# Patient Record
Sex: FEMALE | Race: WHITE
[De-identification: names, ages, dates, MRNs, and addresses within clinical notes are randomized per-mention and may not be internally consistent; named-entity substitution may affect disease eponyms.]

---

## 2017-01-05 PROBLEM — Z85.828 PERSONAL HISTORY OF OTHER MALIGNANT NEOPLASM OF SKIN: Status: ACTIVE | Noted: 2017-01-05

## 2017-03-31 ENCOUNTER — HOSPITAL (OUTPATIENT)
Dept: OTHER | Age: 57
End: 2017-03-31

## 2017-04-27 ENCOUNTER — HOSPITAL ENCOUNTER (OUTPATIENT)
Age: 57
Discharge: ANOTHER HOSPITAL | End: 2017-04-27
Attending: EMERGENCY MEDICINE
Payer: MEDICAID

## 2017-04-27 ENCOUNTER — APPOINTMENT (OUTPATIENT)
Dept: CT IMAGING | Facility: HOSPITAL | Age: 57
End: 2017-04-27
Attending: EMERGENCY MEDICINE
Payer: MEDICAID

## 2017-04-27 ENCOUNTER — HOSPITAL ENCOUNTER (EMERGENCY)
Facility: HOSPITAL | Age: 57
Discharge: HOME | End: 2017-04-27
Attending: EMERGENCY MEDICINE
Payer: MEDICAID

## 2017-04-27 VITALS
HEART RATE: 77 BPM | WEIGHT: 140 LBS | RESPIRATION RATE: 16 BRPM | OXYGEN SATURATION: 97 % | DIASTOLIC BLOOD PRESSURE: 63 MMHG | HEIGHT: 66 IN | SYSTOLIC BLOOD PRESSURE: 91 MMHG | TEMPERATURE: 98 F | BODY MASS INDEX: 22.5 KG/M2

## 2017-04-27 VITALS
SYSTOLIC BLOOD PRESSURE: 95 MMHG | HEART RATE: 69 BPM | RESPIRATION RATE: 16 BRPM | OXYGEN SATURATION: 98 % | WEIGHT: 140 LBS | BODY MASS INDEX: 23 KG/M2 | DIASTOLIC BLOOD PRESSURE: 59 MMHG | TEMPERATURE: 98 F

## 2017-04-27 DIAGNOSIS — R10.9 ABDOMINAL PAIN, ACUTE: Primary | ICD-10-CM

## 2017-04-27 DIAGNOSIS — K52.9 GASTROENTERITIS: Primary | ICD-10-CM

## 2017-04-27 PROCEDURE — 83690 ASSAY OF LIPASE: CPT | Performed by: EMERGENCY MEDICINE

## 2017-04-27 PROCEDURE — 96361 HYDRATE IV INFUSION ADD-ON: CPT

## 2017-04-27 PROCEDURE — 99205 OFFICE O/P NEW HI 60 MIN: CPT

## 2017-04-27 PROCEDURE — 85025 COMPLETE CBC W/AUTO DIFF WBC: CPT | Performed by: EMERGENCY MEDICINE

## 2017-04-27 PROCEDURE — 81003 URINALYSIS AUTO W/O SCOPE: CPT | Performed by: EMERGENCY MEDICINE

## 2017-04-27 PROCEDURE — 99284 EMERGENCY DEPT VISIT MOD MDM: CPT

## 2017-04-27 PROCEDURE — 80048 BASIC METABOLIC PNL TOTAL CA: CPT | Performed by: EMERGENCY MEDICINE

## 2017-04-27 PROCEDURE — 80076 HEPATIC FUNCTION PANEL: CPT | Performed by: EMERGENCY MEDICINE

## 2017-04-27 PROCEDURE — 74177 CT ABD & PELVIS W/CONTRAST: CPT

## 2017-04-27 PROCEDURE — 96374 THER/PROPH/DIAG INJ IV PUSH: CPT

## 2017-04-27 RX ORDER — SACCHAROMYCES BOULARDII 250 MG
250 CAPSULE ORAL 2 TIMES DAILY
Qty: 20 CAPSULE | Refills: 0 | Status: SHIPPED | OUTPATIENT
Start: 2017-04-27 | End: 2017-05-07

## 2017-04-27 RX ORDER — ONDANSETRON 4 MG/1
4 TABLET, ORALLY DISINTEGRATING ORAL EVERY 4 HOURS PRN
Qty: 15 TABLET | Refills: 0 | Status: SHIPPED | OUTPATIENT
Start: 2017-04-27 | End: 2017-10-13

## 2017-04-27 RX ORDER — ONDANSETRON 2 MG/ML
4 INJECTION INTRAMUSCULAR; INTRAVENOUS ONCE
Status: COMPLETED | OUTPATIENT
Start: 2017-04-27 | End: 2017-04-27

## 2017-04-27 NOTE — ED NOTES
Pt given discharge instructions, prescriptions and follow up.  Pt verbalized understanding, discharged home with son

## 2017-04-27 NOTE — ED PROVIDER NOTES
Patient Seen in: [facility] AND [facility] Immediate [facility] In [address] [place] [address]    History   Patient presents with:  Abdominal Pain    Stated Complaint: v/d stomach pain    HPI    49-year-old female presents for evaluation of abdominal pain.   Patient reports since last ni Effort normal and breath sounds normal. No respiratory distress. Abdominal: Soft. Bowel sounds are normal. She exhibits no distension. TTP at RLQ   Musculoskeletal: Normal range of motion.    Neurological: She is alert and oriented to person, place, and

## 2017-04-27 NOTE — ED PROVIDER NOTES
Patient Seen in: [facility] AND [facility] Emergency Department    History   Patient presents with:  Nausea/Vomiting/Diarrhea (gastrointestinal)    Stated Complaint: Vomiting, sweating, diarrhea. Sent from [facility] urgent care.     HPI      History of present illne Gastrointestinal:  soft, nondistended, tender mid periumbilical and over McBurney's, mild voluntary guarding, no masses normal bowel sounds   Neurological: II-XII grossly intact  no focal deficits  Skin: warm and dry, no rashes.   Musculoskeletal: neck i etiology. 5. 1 cm curvilinear area of calcification near the splenic hilum may be due to aneurysm vs calcific atherosclerosis. 6. No free fluid or air.            Re exam feeling better, reviewed results will d/c home          Disposition and Plan     Clini

## 2017-04-27 NOTE — ED NOTES
Pt presents to the er with c/o nausea, vomiting and diarrhea x 2 episodes since this am. Pt denies abd pain or urinary complaints currently. Pt looks pale and tired. Iv line established in triage, pt medicated per written orders.

## 2017-08-13 ENCOUNTER — HOSPITAL ENCOUNTER (EMERGENCY)
Facility: HOSPITAL | Age: 57
Discharge: HOME | End: 2017-08-13
Attending: EMERGENCY MEDICINE
Payer: MEDICAID

## 2017-08-13 VITALS
BODY MASS INDEX: 22.5 KG/M2 | DIASTOLIC BLOOD PRESSURE: 57 MMHG | HEIGHT: 66 IN | RESPIRATION RATE: 18 BRPM | WEIGHT: 140 LBS | OXYGEN SATURATION: 97 % | HEART RATE: 77 BPM | TEMPERATURE: 97 F | SYSTOLIC BLOOD PRESSURE: 110 MMHG

## 2017-08-13 DIAGNOSIS — K52.9 GASTROENTERITIS: Primary | ICD-10-CM

## 2017-08-13 LAB
ALBUMIN SERPL BCP-MCNC: 4 G/DL (ref 3.5–4.8)
ALP SERPL-CCNC: 44 U/L (ref 32–100)
ALT SERPL-CCNC: 24 U/L (ref 14–54)
ANION GAP SERPL CALC-SCNC: 6 MMOL/L (ref 0–18)
AST SERPL-CCNC: 29 U/L (ref 15–41)
BACTERIA UR QL AUTO: NEGATIVE /HPF
BASOPHILS # BLD: 0 K/UL (ref 0–0.2)
BASOPHILS NFR BLD: 0 %
BILIRUB DIRECT SERPL-MCNC: 0.1 MG/DL (ref 0–0.2)
BILIRUB SERPL-MCNC: 0.7 MG/DL (ref 0.3–1.2)
BILIRUB UR QL: NEGATIVE
BUN SERPL-MCNC: 16 MG/DL (ref 8–20)
BUN/CREAT SERPL: 24.6 (ref 10–20)
CALCIUM SERPL-MCNC: 9.5 MG/DL (ref 8.5–10.5)
CHLORIDE SERPL-SCNC: 106 MMOL/L (ref 95–110)
CLARITY UR: CLEAR
CO2 SERPL-SCNC: 26 MMOL/L (ref 22–32)
COLOR UR: YELLOW
CREAT SERPL-MCNC: 0.65 MG/DL (ref 0.5–1.5)
EOSINOPHIL # BLD: 0.1 K/UL (ref 0–0.7)
EOSINOPHIL NFR BLD: 1 %
ERYTHROCYTE [DISTWIDTH] IN BLOOD BY AUTOMATED COUNT: 13 % (ref 11–15)
GLUCOSE SERPL-MCNC: 111 MG/DL (ref 70–99)
GLUCOSE UR-MCNC: NEGATIVE MG/DL
HCT VFR BLD AUTO: 38.2 % (ref 35–48)
HGB BLD-MCNC: 13.1 G/DL (ref 12–16)
HGB UR QL STRIP.AUTO: NEGATIVE
KETONES UR-MCNC: NEGATIVE MG/DL
LIPASE SERPL-CCNC: 46 U/L (ref 22–51)
LYMPHOCYTES # BLD: 0.7 K/UL (ref 1–4)
LYMPHOCYTES NFR BLD: 10 %
MCH RBC QN AUTO: 30.8 PG (ref 27–32)
MCHC RBC AUTO-ENTMCNC: 34.2 G/DL (ref 32–37)
MCV RBC AUTO: 90 FL (ref 80–100)
MONOCYTES # BLD: 0.4 K/UL (ref 0–1)
MONOCYTES NFR BLD: 6 %
NEUTROPHILS # BLD AUTO: 6.3 K/UL (ref 1.8–7.7)
NEUTROPHILS NFR BLD: 83 %
NITRITE UR QL STRIP.AUTO: NEGATIVE
OSMOLALITY UR CALC.SUM OF ELEC: 288 MOSM/KG (ref 275–295)
PH UR: 7 [PH] (ref 5–8)
PLATELET # BLD AUTO: 140 K/UL (ref 140–400)
PMV BLD AUTO: 9 FL (ref 7.4–10.3)
POTASSIUM SERPL-SCNC: 4 MMOL/L (ref 3.3–5.1)
PROT SERPL-MCNC: 7.6 G/DL (ref 5.9–8.4)
PROT UR-MCNC: NEGATIVE MG/DL
RBC # BLD AUTO: 4.24 M/UL (ref 3.7–5.4)
RBC #/AREA URNS AUTO: 2 /HPF
SODIUM SERPL-SCNC: 138 MMOL/L (ref 136–144)
SP GR UR STRIP: 1.02 (ref 1–1.03)
UROBILINOGEN UR STRIP-ACNC: <2
VIT C UR-MCNC: NEGATIVE MG/DL
WBC # BLD AUTO: 7.6 K/UL (ref 4–11)
WBC #/AREA URNS AUTO: <1 /HPF

## 2017-08-13 PROCEDURE — 81001 URINALYSIS AUTO W/SCOPE: CPT | Performed by: EMERGENCY MEDICINE

## 2017-08-13 PROCEDURE — 80076 HEPATIC FUNCTION PANEL: CPT | Performed by: EMERGENCY MEDICINE

## 2017-08-13 PROCEDURE — 99284 EMERGENCY DEPT VISIT MOD MDM: CPT

## 2017-08-13 PROCEDURE — 80048 BASIC METABOLIC PNL TOTAL CA: CPT | Performed by: EMERGENCY MEDICINE

## 2017-08-13 PROCEDURE — 85025 COMPLETE CBC W/AUTO DIFF WBC: CPT | Performed by: EMERGENCY MEDICINE

## 2017-08-13 PROCEDURE — 83690 ASSAY OF LIPASE: CPT | Performed by: EMERGENCY MEDICINE

## 2017-08-13 PROCEDURE — 96361 HYDRATE IV INFUSION ADD-ON: CPT

## 2017-08-13 PROCEDURE — 96374 THER/PROPH/DIAG INJ IV PUSH: CPT

## 2017-08-13 RX ORDER — ONDANSETRON 4 MG/1
4 TABLET, ORALLY DISINTEGRATING ORAL EVERY 8 HOURS PRN
Qty: 20 TABLET | Refills: 0 | Status: SHIPPED | OUTPATIENT
Start: 2017-08-13 | End: 2017-10-13

## 2017-08-13 RX ORDER — ONDANSETRON 2 MG/ML
4 INJECTION INTRAMUSCULAR; INTRAVENOUS ONCE
Status: COMPLETED | OUTPATIENT
Start: 2017-08-13 | End: 2017-08-13

## 2017-08-13 NOTE — ED NOTES
59yo F arrives to ER with c/o epigastric/mid abdominal pain with nausea and one episode of emesis since last night. Son sick with similar symptoms. Daughter also being seen for similar symptoms in ER. Patient denies diarrhea, urinary complaints or fever.

## 2017-08-13 NOTE — ED PROVIDER NOTES
Patient Seen in: [facility] AND [facility] Emergency Department    History   Patient presents with:  Abdomen/Flank Pain (GI/)    Stated Complaint: Abdominal pain since 10pm last night    HPI    Patient presents with complaint of vomiting.   She had a sense of n 97 °F (36.1 °C)  Temp src: Temporal  SpO2: 97 %  O2 Device: None (Room air)    Current:/64   Pulse 73   Temp (!) 97 °F (36.1 °C) (Temporal)   Resp 14   Ht 167.6 cm (5' 6\")   Wt 63.5 kg   SpO2 97%   BMI 22.60 kg/m²         Physical Exam  Constitution DIFFERENTIAL WITH PLATELET    Narrative: The following orders were created for panel order CBC WITH DIFFERENTIAL WITH PLATELET.   Procedure                               Abnormality         Status                     ---------

## 2017-08-13 NOTE — ED NOTES
Report rec'd, pt resting on cart, IV fluids infusing well. Denies any pain. Awaiting MD disposition.

## 2018-02-12 ENCOUNTER — OFFICE VISIT (OUTPATIENT)
Dept: ORTHOPEDICS CLINIC | Facility: CLINIC | Age: 58
End: 2018-02-12

## 2018-02-12 ENCOUNTER — HOSPITAL ENCOUNTER (OUTPATIENT)
Dept: GENERAL RADIOLOGY | Facility: HOSPITAL | Age: 58
Discharge: HOME | End: 2018-02-12
Attending: ORTHOPAEDIC SURGERY
Payer: MEDICAID

## 2018-02-12 DIAGNOSIS — M22.42 PATELLA, CHONDROMALACIA, LEFT: Primary | ICD-10-CM

## 2018-02-12 DIAGNOSIS — M25.562 LEFT KNEE PAIN, UNSPECIFIED CHRONICITY: ICD-10-CM

## 2018-02-12 PROCEDURE — 99212 OFFICE O/P EST SF 10 MIN: CPT | Performed by: ORTHOPAEDIC SURGERY

## 2018-02-12 PROCEDURE — 99203 OFFICE O/P NEW LOW 30 MIN: CPT | Performed by: ORTHOPAEDIC SURGERY

## 2018-02-12 PROCEDURE — 73562 X-RAY EXAM OF KNEE 3: CPT | Performed by: ORTHOPAEDIC SURGERY

## 2018-02-12 RX ORDER — ALPRAZOLAM 0.25 MG/1
TABLET ORAL
Refills: 0 | COMMUNITY
Start: 2018-02-08

## 2018-02-12 RX ORDER — NAPROXEN 500 MG/1
TABLET ORAL
Refills: 0 | COMMUNITY
Start: 2017-12-29 | End: 2019-10-16 | Stop reason: ALTCHOICE

## 2018-02-12 RX ORDER — FLUTICASONE PROPIONATE 50 MCG
SPRAY, SUSPENSION (ML) NASAL
Refills: 3 | COMMUNITY
Start: 2017-12-29 | End: 2019-10-16 | Stop reason: ALTCHOICE

## 2018-02-12 RX ORDER — FEXOFENADINE HCL 180 MG/1
TABLET ORAL
Refills: 0 | COMMUNITY
Start: 2017-12-29 | End: 2019-10-16 | Stop reason: ALTCHOICE

## 2018-02-12 NOTE — PROGRESS NOTES
Patient is a pleasant 78-year-old female who presents with 4 months anterolateral left knee pain. 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   Level walking is usually not a problem fully flexes fully extends there is no warmth no erythema.   Significant findings a patella grind it seems to cause most of her complaints today more anterior and lateral although there is mild lateral joint tenderness    Imaging studies were reviewed today

## 2019-09-24 ENCOUNTER — OFFICE VISIT (OUTPATIENT)
Dept: SURGERY | Facility: CLINIC | Age: 59
End: 2019-09-24
Payer: MEDICAID

## 2019-09-24 VITALS
HEART RATE: 69 BPM | DIASTOLIC BLOOD PRESSURE: 62 MMHG | SYSTOLIC BLOOD PRESSURE: 92 MMHG | HEIGHT: 66 IN | BODY MASS INDEX: 22.5 KG/M2 | RESPIRATION RATE: 16 BRPM | TEMPERATURE: 98 F | WEIGHT: 140 LBS

## 2019-09-24 DIAGNOSIS — N81.4 CYSTOCELE WITH PROLAPSE: Primary | ICD-10-CM

## 2019-09-24 PROCEDURE — 99244 OFF/OP CNSLTJ NEW/EST MOD 40: CPT | Performed by: UROLOGY

## 2019-09-24 NOTE — PROGRESS NOTES
SUBJECTIVE:  Florentin Schaeffer is a 59year old female who presents for a consultation at the request of, and a copy of this note will be sent to, Dr. Eric Cruz, for evaluation of  Cystocele, sensation of falling bladder .  She states that the problem is un Patient Position: Sitting, Cuff Size: adult)   Pulse 69   Temp 98.1 °F (36.7 °C) (Oral)   Resp 16   Ht 5' 6\" (1.676 m)   Wt 140 lb (63.5 kg)   BMI 22.60 kg/m²   She appears well, in no apparent distress.   Alert and oriented times three, pleasant and coope

## 2019-10-16 ENCOUNTER — OFFICE VISIT (OUTPATIENT)
Dept: UROLOGY | Facility: HOSPITAL | Age: 59
End: 2019-10-16
Attending: OBSTETRICS & GYNECOLOGY
Payer: MEDICAID

## 2019-10-16 VITALS
WEIGHT: 140 LBS | BODY MASS INDEX: 22.5 KG/M2 | HEIGHT: 66 IN | SYSTOLIC BLOOD PRESSURE: 118 MMHG | DIASTOLIC BLOOD PRESSURE: 68 MMHG

## 2019-10-16 DIAGNOSIS — K59.00 CONSTIPATION, UNSPECIFIED CONSTIPATION TYPE: ICD-10-CM

## 2019-10-16 DIAGNOSIS — R19.09 MASS OF RIGHT INGUINAL REGION: ICD-10-CM

## 2019-10-16 DIAGNOSIS — N95.2 VAGINAL ATROPHY: Primary | ICD-10-CM

## 2019-10-16 PROCEDURE — 99212 OFFICE O/P EST SF 10 MIN: CPT

## 2019-10-16 RX ORDER — ESTRADIOL 0.1 MG/G
CREAM VAGINAL
Qty: 1 TUBE | Refills: 3 | Status: SHIPPED | OUTPATIENT
Start: 2019-10-16

## 2019-10-16 NOTE — PROGRESS NOTES
ID: Mai Brizuela  : 1960  Date: 10/16/2019     Referred by Dr. Renee Forte MD    Patient presents with:  Prolapse      HPI:  The patient is a 59year-old [address] speaking female,  (vaginal deliveries), who presents for evaluation of quest Review of Systems:    A comprehensive 12 point review of systems was completed. Pertinent positives noted in the the HPI.   Denies CP  Denies SOB    Vitals:  /68   Ht 66\"   Wt 140 lb (63.5 kg)   BMI 22.60 kg/m²        GENERAL EXAM:  GENERAL: consultation with General surgeon. Diagnostic Items:  None    Medications Discussed:  Estrace vaginal cream 1 gram per vagina x3 per week. Treatment Plan, Non-surgical:   None    Treatment Plan, Surgical:   None at this time.      Pt verbalizes unde

## 2020-01-21 ENCOUNTER — OFFICE VISIT (OUTPATIENT)
Dept: UROLOGY | Facility: HOSPITAL | Age: 60
End: 2020-01-21
Attending: OBSTETRICS & GYNECOLOGY
Payer: MEDICAID

## 2020-01-21 VITALS — DIASTOLIC BLOOD PRESSURE: 60 MMHG | SYSTOLIC BLOOD PRESSURE: 102 MMHG

## 2020-01-21 DIAGNOSIS — K59.00 CONSTIPATION, UNSPECIFIED CONSTIPATION TYPE: Primary | ICD-10-CM

## 2020-01-21 DIAGNOSIS — N95.2 VAGINAL ATROPHY: ICD-10-CM

## 2020-01-21 DIAGNOSIS — N64.4 BREAST PAIN: ICD-10-CM

## 2020-01-21 PROBLEM — R19.09 MASS OF RIGHT INGUINAL REGION: Status: RESOLVED | Noted: 2019-10-16 | Resolved: 2020-01-21

## 2020-01-21 PROCEDURE — 99212 OFFICE O/P EST SF 10 MIN: CPT

## 2020-01-21 NOTE — PROGRESS NOTES
ID: Ulysses Bandy  : 1960  Date: 2020      Patient presents with:   Other: 3 mo f/u, intreptor # [id number] used for the visit      HPI:  The patient is a 59year-old [address] speaking female,  (vaginal deliveries), who was last seen in my off appearance for age. + atrophy, no lesions  Urethra: + atrophy, non tender  Bladder:non fullness, non tender  Vagina: + atrophy, no lesions, no changes in pelvic support, no prolapse noted.   Redundant hymeneal skin noted in posterior vagina may explain treasure

## 2020-10-24 ENCOUNTER — HOSPITAL ENCOUNTER (EMERGENCY)
Facility: HOSPITAL | Age: 60
Discharge: HOME | End: 2020-10-24
Attending: EMERGENCY MEDICINE
Payer: MEDICAID

## 2020-10-24 ENCOUNTER — APPOINTMENT (OUTPATIENT)
Dept: GENERAL RADIOLOGY | Facility: HOSPITAL | Age: 60
End: 2020-10-24
Attending: EMERGENCY MEDICINE
Payer: MEDICAID

## 2020-10-24 VITALS
HEART RATE: 66 BPM | DIASTOLIC BLOOD PRESSURE: 75 MMHG | SYSTOLIC BLOOD PRESSURE: 115 MMHG | BODY MASS INDEX: 22.5 KG/M2 | RESPIRATION RATE: 18 BRPM | HEIGHT: 66 IN | TEMPERATURE: 99 F | WEIGHT: 140 LBS | OXYGEN SATURATION: 98 %

## 2020-10-24 DIAGNOSIS — Z20.822 ENCOUNTER FOR LABORATORY TESTING FOR COVID-19 VIRUS: Primary | ICD-10-CM

## 2020-10-24 PROCEDURE — 71045 X-RAY EXAM CHEST 1 VIEW: CPT | Performed by: EMERGENCY MEDICINE

## 2020-10-24 PROCEDURE — 99284 EMERGENCY DEPT VISIT MOD MDM: CPT

## 2020-10-24 RX ORDER — ALBUTEROL SULFATE 90 UG/1
2 AEROSOL, METERED RESPIRATORY (INHALATION) EVERY 4 HOURS PRN
Qty: 1 INHALER | Refills: 0 | Status: SHIPPED | OUTPATIENT
Start: 2020-10-24 | End: 2020-11-23

## 2020-10-24 RX ORDER — ALBUTEROL SULFATE 90 UG/1
2 AEROSOL, METERED RESPIRATORY (INHALATION) ONCE
Status: COMPLETED | OUTPATIENT
Start: 2020-10-24 | End: 2020-10-24

## 2020-10-24 NOTE — ED PROVIDER NOTES
Patient Seen in: [facility] AND [facility] Emergency Department    History   Patient presents with:  Testing    Stated Complaint: Covid testing     HPI    24-year-old female without past medical history presenting for evaluation of 1 week of cough associated with °F (37.3 °C)   Temp src Oral   SpO2 98 %   O2 Device None (Room air)       Current:/75   Pulse 73   Temp 99.2 °F (37.3 °C) (Oral)   Resp 18   Ht 167.6 cm (5' 6\")   Wt 63.5 kg   SpO2 98%   BMI 22.60 kg/m²         Physical Exam   Constitutional: No di tachycardia/respiratory distress/hypoxia, SARS-CoV-2 PCR sent with same pending.  Given improvement in symptoms with use of sister's MDI, will DC home with same and close/ongoing outpatient followup - patient comfortable with plan of care and understanding

## 2022-04-18 NOTE — ED AVS SNAPSHOT
Aníbal Monae   MRN: [id number]    Department:  [facility] Emergency Department   Date of Visit:  8/13/2017           Disclosure     Insurance plans vary and the physician(s) referred by the ER may not be covered by your plan.  Please contact y CARE PHYSICIAN AT ONCE OR RETURN IMMEDIATELY TO THE EMERGENCY DEPARTMENT. If you have been prescribed any medication(s), please fill your prescription right away and begin taking the medication(s) as directed.   If you believe that any of the medications Azithromycin Counseling:  I discussed with the patient the risks of azithromycin including but not limited to GI upset, allergic reaction, drug rash, diarrhea, and yeast infections.

## 2023-08-08 ENCOUNTER — OFFICE VISIT (OUTPATIENT)
Dept: UROLOGY | Facility: HOSPITAL | Age: 63
End: 2023-08-08
Attending: OBSTETRICS & GYNECOLOGY
Payer: MEDICAID

## 2023-08-08 VITALS
WEIGHT: 140 LBS | RESPIRATION RATE: 18 BRPM | BODY MASS INDEX: 22.5 KG/M2 | DIASTOLIC BLOOD PRESSURE: 60 MMHG | SYSTOLIC BLOOD PRESSURE: 110 MMHG | HEIGHT: 66 IN

## 2023-08-08 DIAGNOSIS — N95.2 VAGINAL ATROPHY: Primary | ICD-10-CM

## 2023-08-08 PROCEDURE — 99212 OFFICE O/P EST SF 10 MIN: CPT

## 2023-08-08 NOTE — PROGRESS NOTES
ID: Gelacio Boone  : 1960  Date: 23      Patient presents with:  Prolapse: Reports prolapse has worsened in the past three years. HPI:  The patient is a 57year-old Greek speaking female,  (vaginal deliveries), who was last seen in my office on 20. Please refer to my previous office note for full details. To summarize, she came in for evaluation of possible vaginal prolapse. Exam demonstrated no prolapse, only redundant vaginal hymeneal skin noted in posterior vagina. She is status post vaginal hysterectomy in  for abnormal bleeding and had a sling for DYLLAN at that time. Returns due to worsening prolapse symptoms. She feels a bulge at the vaginal opening with wiping. See urogyn symmary. No changes in her health. No UTIs. Not sexually active at this time (no partner). rogynecology Summary     Prolapse YesProlapse. Yes. The comment is Denies splinting. Taken on 23 1106   DYLLAN No   Urge Incontinence No   Nocturia Frequency 1   Frequency 2 - 3 hours   Incomplete emptying No   Constipation No   Wears pad day? 0   Wears Pad Night? 0   Activities are limited by UI/POP? No   Currently Sexually Active No   Avoids sexual activity due to OtherAvoids sexual activity due to. Other. The comment is No  or partner. . 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 (Patient not taking: Reported on 2023), Disp: 1 Units, Rfl: 0  Estradiol (ESTRACE) 0.1 MG/GM Vaginal Cream, Apply 1 gram vaginally 3 times per week.  (Patient not taking: Reported on 2020), Disp: 1 Tube, Rfl: 3  ALPRAZolam 0.25 MG Oral Tab, , Disp: , Rfl: 0    No current facility-administered medications on file prior to visit. No Known Allergies  Review of Systems:    A comprehensive 12 point review of systems was completed. Pertinent positives noted in the the HPI. Denies CP  Denies SOB      Exam:  Vitals:   08/08/23  1104   BP: 110/60   Resp: 18     GENERAL EXAM:  GENERAL:  Alert and oriented. Well-nourished, normally developed. Thought and emotional status are appropriate, speech is understandable. No acute distress. HEAD: Normocephalic and atraumatic with normal hair distribution  LUNGS:  Normal respiratory effort. ABDOMEN: Right groin mass. Tender to palpation. Reducible when supine. EXTREMITIES:  Without edema, varicosities or lesions. SKIN:  Warm and dry, with good color and turgor. No lesions. PELVIC EXAM:  External Genitalia: Normal appearance for age. + atrophy, no lesions  Urethra: + atrophy, non tender  Bladder:non fullness, non tender  Vagina: + atrophy, no lesions   Cervix and uterus: surgically absent  Adnexa:no masses, non tender  Perineum: non tender  Anus: no hemorrhoids  Rectum: deferred     PELVIS FLOOR NEUROMUSCULAR FUNCTION:  Strength:  3/5  Perineal Sensation:  Normal        PELVIC SUPPORT: Patient examined supina and standing,   [place]:  0  Ant:  0  Post:  0, redundant hymeneal tissue  CST:  negative  UVJ: + hypermobile    Impression:  (N95.2) Vaginal atrophy  (primary encounter diagnosis)      Plan:   Once again, patient was reassured. No prolapse noted. Continue Kegel exercises. Agrees to follow up in 1 year or sooner prn.        Juve Goff MD  Female Pelvic Medicine and  Reconstructive Surgery (Urogynecology)  [phone] (Pager)

## 2023-10-08 ENCOUNTER — HOSPITAL ENCOUNTER (EMERGENCY)
Facility: HOSPITAL | Age: 63
Discharge: HOME | End: 2023-10-08
Attending: EMERGENCY MEDICINE

## 2023-10-08 VITALS
OXYGEN SATURATION: 100 % | HEART RATE: 56 BPM | WEIGHT: 140 LBS | DIASTOLIC BLOOD PRESSURE: 72 MMHG | TEMPERATURE: 98 F | BODY MASS INDEX: 23 KG/M2 | SYSTOLIC BLOOD PRESSURE: 117 MMHG | RESPIRATION RATE: 20 BRPM

## 2023-10-08 DIAGNOSIS — M54.40 BACK PAIN OF LUMBAR REGION WITH SCIATICA: Primary | ICD-10-CM

## 2023-10-08 PROCEDURE — 99283 EMERGENCY DEPT VISIT LOW MDM: CPT

## 2023-10-08 PROCEDURE — 96372 THER/PROPH/DIAG INJ SC/IM: CPT

## 2023-10-08 PROCEDURE — 99284 EMERGENCY DEPT VISIT MOD MDM: CPT

## 2023-10-08 RX ORDER — DIAZEPAM 5 MG/1
5 TABLET ORAL ONCE
Status: COMPLETED | OUTPATIENT
Start: 2023-10-08 | End: 2023-10-08

## 2023-10-08 RX ORDER — KETOROLAC TROMETHAMINE 15 MG/ML
30 INJECTION, SOLUTION INTRAMUSCULAR; INTRAVENOUS ONCE
Status: COMPLETED | OUTPATIENT
Start: 2023-10-08 | End: 2023-10-08

## 2023-10-08 RX ORDER — KETOROLAC TROMETHAMINE 10 MG/1
10 TABLET, FILM COATED ORAL EVERY 6 HOURS PRN
Qty: 12 TABLET | Refills: 0 | Status: SHIPPED | OUTPATIENT
Start: 2023-10-08 | End: 2023-10-15

## 2023-10-08 RX ORDER — METHYLPREDNISOLONE 4 MG/1
TABLET ORAL
Qty: 1 EACH | Refills: 0 | Status: SHIPPED | OUTPATIENT
Start: 2023-10-08

## 2023-10-08 NOTE — ED INITIAL ASSESSMENT (HPI)
The patient complains of atraumatic right buttock pain with radiation down the back of her right leg. History of sciatica reported.

## 2024-02-24 ENCOUNTER — EXTERNAL RECORD (OUTPATIENT)
Dept: HEALTH INFORMATION MANAGEMENT | Facility: OTHER | Age: 64
End: 2024-02-24

## 2024-02-24 ENCOUNTER — IMAGING SERVICES (OUTPATIENT)
Dept: OTHER | Age: 64
End: 2024-02-24

## 2024-04-15 ENCOUNTER — TELEPHONE (OUTPATIENT)
Dept: NEUROSURGERY | Age: 64
End: 2024-04-15

## 2024-06-05 ENCOUNTER — APPOINTMENT (OUTPATIENT)
Dept: NEUROSURGERY | Age: 64
End: 2024-06-05

## 2024-08-13 ENCOUNTER — OFFICE VISIT (OUTPATIENT)
Dept: UROLOGY | Facility: HOSPITAL | Age: 64
End: 2024-08-13
Attending: OBSTETRICS & GYNECOLOGY
Payer: MEDICARE

## 2024-08-13 VITALS — WEIGHT: 139 LBS | HEIGHT: 66 IN | BODY MASS INDEX: 22.34 KG/M2 | RESPIRATION RATE: 18 BRPM

## 2024-08-13 DIAGNOSIS — N95.2 VAGINAL ATROPHY: Primary | ICD-10-CM

## 2024-08-13 DIAGNOSIS — K59.00 CONSTIPATION, UNSPECIFIED CONSTIPATION TYPE: ICD-10-CM

## 2024-08-13 PROBLEM — N64.4 BREAST PAIN: Status: RESOLVED | Noted: 2020-01-21 | Resolved: 2024-08-13

## 2024-08-13 PROCEDURE — 99212 OFFICE O/P EST SF 10 MIN: CPT

## 2024-08-13 NOTE — PROGRESS NOTES
ID: Snow Noguera  : 1960  Date: 24      Chief Complaint   Patient presents with    Prolapse     Bulge       HPI:  The patient is a 63 year-old Ukrainian speaking female,  (vaginal deliveries), who was last seen on 23. To summarize, she came in for evaluation of possible vaginal prolapse.  Exam demonstrated no prolapse, only redundant vaginal hymeneal skin noted in posterior vagina.     She is status post vaginal hysterectomy in  for abnormal bleeding and had a sling for DYLLAN at that time.    Instructed on kegels. Returns for follow up.     Interval history:  Feels something coming down the vagina when she does heavy lifting.  No DYLLAN or UUI  No UTIs  Bowels are constipated at times. Occasional sensation of incomplete defecation.   Not sexually active at this time (no partner).   Takes Xanax prn.     Review of Systems:    A comprehensive 12 point review of systems was completed.  Pertinent positives noted in the the HPI.  Denies CP  Denies SOB      Exam:  Vitals:  Vitals:    24 1434   Resp: 18       GENERAL EXAM:  GENERAL:  Alert and oriented. Well-nourished, normally developed.  Thought and emotional status are appropriate, speech is understandable.  No acute distress.   HEAD: Normocephalic and atraumatic with normal hair distribution  LUNGS:  Normal respiratory effort.    ABDOMEN: Right groin mass.  Tender to palpation.  Reducible when supine.    EXTREMITIES:  Without edema, varicosities or lesions.   SKIN:  Warm and dry, with good color and turgor. No lesions.     PELVIC EXAM:  External Genitalia: Normal appearance for age. + atrophy, no lesions  Urethra: + atrophy, non tender  Bladder:non fullness, non tender  Vagina: + atrophy, no lesions   Cervix and uterus: surgically absent  Adnexa:no masses, non tender  Perineum: non tender  Anus: no hemorrhoids  Rectum: normal tone and squeeze.      PELVIS FLOOR NEUROMUSCULAR FUNCTION:  Strength:  3/5  Perineal Sensation:  Normal        PELVIC  SUPPORT:   [place]:  0  Ant:  0  Post:  1 (high rectocele ?, not confirmed on rectovaginal exam.   CST:  negative  UVJ: + hypermobile    Impression:    ICD-10-CM    1. Vaginal atrophy  N95.2           Plan:   No significant prolapse noted on exam.  Recommend daily fiber supplement.  Wants to have yearly visits to assure stability.    Follow up in 1 year or sooner prn.       Floresita Littlejohn MD  Female Pelvic Medicine and  Reconstructive Surgery (Urogynecology)  [phone] (Pager)

## 2025-07-07 ENCOUNTER — APPOINTMENT (OUTPATIENT)
Dept: OTHER | Facility: HOSPITAL | Age: 65
End: 2025-07-07
Attending: EMERGENCY MEDICINE

## (undated) NOTE — ED AVS SNAPSHOT
[facility] Emergency Department    [address]     [place] [address]    Phone:  [phone]    Fax:  [phone]           Cesar Aron   MRN: [id number]    Department:  [facility] Emergency Department   Date of Visit:  4/27/ and Class Registration line at [phone] or find a doctor online by visiting [email or web address].    IF THERE IS ANY CHANGE OR WORSENING OF YOUR CONDITION, CALL YOUR PRIMARY CARE PHYSICIAN AT ONCE OR RETURN IMMEDIATELY TO [address] [place] [address].     If

## (undated) NOTE — ED AVS SNAPSHOT
[facility] Emergency Department    [address]     [place] [address]    Phone:  [phone]    Fax:  [phone]           Hue Bone   MRN: [id number]    Department:  [facility] Emergency Department   Date of Visit:  4/27/ Where to Get Your Medications      You can get these medications from any pharmacy     Bring a paper prescription for each of these medications    - ondansetron 4 MG Tbdp  - saccharomyces boulardii 250 MG Caps            Discharge References/Attachments and Class Registration line at [phone] or find a doctor online by visiting [email or web address].    IF THERE IS ANY CHANGE OR WORSENING OF YOUR CONDITION, CALL YOUR PRIMARY CARE PHYSICIAN AT ONCE OR RETURN IMMEDIATELY TO [address] [place] [address].     If you to explore options for quitting.     - If you have concerns related to behavioral health issues or thoughts of harming yourself, contact [facility] at [phone].     - If you don’t have insurance, Adelaida Moser